# Patient Record
(demographics unavailable — no encounter records)

---

## 2025-02-26 NOTE — DATA REVIEWED
[MRI] : MRI [Right] : of the right [Foot] : foot [I independently reviewed and interpreted images and report] : I independently reviewed and interpreted images and report [I reviewed the films/CD] : I reviewed the films/CD

## 2025-02-26 NOTE — PHYSICAL EXAM
[NL (40)] : plantar flexion 40 degrees [NL (20)] : eversion 20 degrees [2+] : posterior tibialis pulse: 2+ [Normal] : saphenous nerve sensation normal [Mild] : mild swelling of dorsal foot [2nd] : 2nd [3rd] : 3rd [4th] : 4th [4___] : eversion 4[unfilled]/5 [] : ambulation with crutches [Right] : right foot [Weight -] : weightbearing
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
## 2025-03-12 NOTE — PHYSICAL EXAM
[Mild] : mild swelling of dorsal foot [NL (40)] : plantar flexion 40 degrees [4___] : eversion 4[unfilled]/5 [2+] : posterior tibialis pulse: 2+ [Normal] : sural nerve sensation normal [Right] : right foot [NL 30)] : inversion 30 degrees [] : no pain when stressing lateral tarsal metatarsal joint [FreeTextEntry3] : Dorsal and plantar foot ecchymosis.  [de-identified] :  AP, lateral and oblique xray views were taken and reviewed today. Nondisplaced fractures at base of 3rd/4th metatarsals, avulsion fracture medial cuneiform. No change in alignment. There is healing noted.  [TWNoteComboBox7] : dorsiflexion 15 degrees [de-identified] : eversion 15 degrees

## 2025-03-12 NOTE — DISCUSSION/SUMMARY
[de-identified] : No surgical indication at this time.  She can be WBAT in CAM boot. The importance of compliance stressed to patient.  Patient was instructed that they can not operate an automatic vehicle while wearing a CAM boot or cast on the right lower extremity. If operating a vehicle that requires use of a clutch, patient may not drive while wearing a CAM boot or cast on the left lower extremity.  NSAIDS, ice, rest, elevation. No gym/sports.   Repeat x-ray will be performed at the next office visit (WB RT foot films).

## 2025-04-02 NOTE — PHYSICAL EXAM
[NL (40)] : plantar flexion 40 degrees [NL 30)] : inversion 30 degrees [2+] : posterior tibialis pulse: 2+ [Normal] : saphenous nerve sensation normal [Right] : right foot [NL (20)] : eversion 20 degrees [5___] : eversion 5[unfilled]/5 [] : no pain when stressing lateral tarsal metatarsal joint [Weight -] : weightbearing [de-identified] :  AP, lateral and oblique xray views were taken and reviewed today. Nondisplaced fractures at base of 3rd/4th metatarsals, avulsion fracture medial cuneiform are all healed.
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
## 2025-04-02 NOTE — DISCUSSION/SUMMARY
[de-identified] : Pt. doing well. Can discontinue CAM boot.  WBAT in supportive footwear/sneaker. Ice to affected area. No high impact activities. No gym sports.   Repeat x-ray will be performed at the next office visit.
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
## 2025-04-23 NOTE — PHYSICAL EXAM
[NL (40)] : plantar flexion 40 degrees [NL 30)] : inversion 30 degrees [NL (20)] : eversion 20 degrees [5___] : eversion 5[unfilled]/5 [2+] : posterior tibialis pulse: 2+ [Normal] : saphenous nerve sensation normal [Right] : right foot [Weight -] : weightbearing [] : patient ambulates without assistive device [de-identified] :  AP, lateral and oblique xray views were taken and reviewed today. Nondisplaced fractures at base of 3rd/4th metatarsals, avulsion fracture medial cuneiform are all healed.

## 2025-04-23 NOTE — DISCUSSION/SUMMARY
[de-identified] : Patient has recovered well, and can slowly resume activities as tolerated.  Home stretching exercises were encouraged to maintain flexibility. Ice/nsaids can be used as needed. Patient will follow up as needed.